# Patient Record
Sex: FEMALE | Race: AMERICAN INDIAN OR ALASKA NATIVE
[De-identification: names, ages, dates, MRNs, and addresses within clinical notes are randomized per-mention and may not be internally consistent; named-entity substitution may affect disease eponyms.]

---

## 2017-04-22 NOTE — ED ELOPEMENT REVIEW
ED Pt Elopement review





- Results review


Lab results: 





 Laboratory Tests











  04/21/17





  14:53


 


Urine HCG, Qual  Negative














- Call Back decision


Pt Call Back Decision: No action required

## 2017-04-22 NOTE — EMERGENCY DEPARTMENT REPORT
ED Extremity Problem HPI





- General


Chief complaint: Extremity Injury, Lower


Stated complaint: PAIN IN KNEE/SWELLING


Time Seen by Provider: 17 13:22


Source: patient, RN notes reviewed, old records reviewed


Mode of arrival: Ambulatory


Limitations: No Limitations





- History of Present Illness


Initial comments: 





PT c/o L knee pain that radiates down her leg x a couple of weeks.  PT states 

she came to the ED yesterday and had an ultra sound but then she left.  PT 

states she has not recently injured her leg/ knee but she has a hx of "sprained 

leg"  PT states she injured her leg at the end of last year.  PT states she 

walks frequently.  





No relief of pain with Goody's powder or Tylenol. 


PT states her leg was swollen yesterday but the swelling decreased after she 

elevated her leg yesterday 


MD Complaint: extremity pain, joint paint


Onset/Timing: 3


-: Gradual, week(s)


Location: left, lower extremity, knee


History of Same: Yes


Severity scale (0 -10): 10


Quality: aching, sharp, constant


Consistency: constant


Improves with: elevation, rest


Worsens with: weight bearing, walking, palpation


Associated Symptoms: denies other symptoms.  denies: fever, rash





- Related Data


 Previous Rx's











 Medication  Instructions  Recorded  Last Taken  Type


 


Ibuprofen [Motrin] 600 mg PO Q8H PRN #15 tablet 17 Unknown Rx


 


traMADol [Ultram] 50 mg PO Q6HR PRN #12 tablet 17 Unknown Rx











 Allergies











Allergy/AdvReac Type Severity Reaction Status Date / Time


 


acetaminophen [From Percocet] Allergy  Itching Verified 17 12:36


 


oxycodone HCl [From Percocet] Allergy  Itching Verified 17 12:36














ED Review of Systems


ROS: 


Stated complaint: PAIN IN KNEE/SWELLING


Other details as noted in HPI





Comment: All other systems reviewed and negative


Constitutional: denies: fever


Cardiovascular: denies: chest pain


Gastrointestinal: denies: nausea, vomiting


Musculoskeletal: as per HPI, joint swelling.  denies: back pain


Skin: denies: rash


Neurological: denies: headache





ED Past Medical Hx





- Past Medical History


Additional medical history: Left leg pain, Hx. of left index finger injury





- Surgical History


Past Surgical History?: No





- Social History


Smoking Status: Current Every Day Smoker


Substance Use Type: Alcohol





- Medications


Home Medications: 


 Home Medications











 Medication  Instructions  Recorded  Confirmed  Last Taken  Type


 


Ibuprofen [Motrin] 600 mg PO Q8H PRN #15 tablet 17  Unknown Rx


 


traMADol [Ultram] 50 mg PO Q6HR PRN #12 tablet 17  Unknown Rx














ED Physical Exam





- General


Limitations: No Limitations


General appearance: alert, in no apparent distress





- Head


Head exam: Present: atraumatic, normocephalic, normal inspection





- Eye


Eye exam: Present: normal appearance, EOMI.  Absent: conjunctival injection





- ENT


ENT exam: Present: normal exam, normal external ear exam





- Neck


Neck exam: Present: normal inspection, full ROM





- Respiratory


Respiratory exam: Present: normal lung sounds bilaterally.  Absent: respiratory 

distress





- Cardiovascular


Cardiovascular Exam: Present: regular rate, normal rhythm





- GI/Abdominal


GI/Abdominal exam: Present: soft.  Absent: tenderness





- Extremities Exam


Extremities exam: Present: normal inspection, full ROM, tenderness, calf 

tenderness.  Absent: pedal edema, joint swelling





- Expanded Lower Extremity Exam


  ** Left


Hip exam: Present: full ROM.  Absent: tenderness


Upper Leg exam: Present: normal inspection.  Absent: tenderness, swelling


Knee exam: Present: normal inspection, full ROM, tenderness (to medial knee ).  

Absent: swelling, ecchymosis, deformity, crepidus


Lower Leg exam: Present: tenderness, Gilmer's sign.  Absent: swelling


Ankle exam: Present: normal inspection, tenderness.  Absent: swelling, 

ecchymosis, deformity, dislocation


Foot/Toe exam: Present: normal inspection, full ROM.  Absent: tenderness


Neuro vascular tendon exam: Absent: pulse deficit





- Back Exam


Back exam: Present: normal inspection, full ROM.  Absent: tenderness, CVA 

tenderness (R), CVA tenderness (L)





- Neurological Exam


Neurological exam: Present: alert, oriented X3





- Psychiatric


Psychiatric exam: Present: normal affect, normal mood





- Skin


Skin exam: Present: warm, dry, intact





ED Course


 Vital Signs











  17





  12:31


 


Temperature 98.0 F


 


Pulse Rate 70


 


Respiratory 18





Rate 


 


Blood Pressure 136/78


 


O2 Sat by Pulse 100





Oximetry 














- Reevaluation(s)


Reevaluation #1: 





17 14:19


PT aware no DVT seen on US yesterday. 


Reevaluation #2: 





17 14:35


PT states pain improved sp Toradol.  PT aware of my interpretation of the XR  


Reevaluation #3: 





17 


PT placed in knee immobilizer by RN, pt NVI 





- Pulse Oximetry Interpretation


  ** Digit-Finger


Initial Pulse Oximetry Readin


Actions Taken: none





ED Medical Decision Making





- Radiology Data


Radiology results: image reviewed


interpreted by me: 


L knee- NAP 





- Differential Diagnosis


dvt, strain, oa, effusion 


Critical Care Time: No


Critical care attestation.: 


If time is entered above; I have spent that time in minutes in the direct care 

of this critically ill patient, excluding procedure time.








ED Disposition


Clinical Impression: 


 Acute pain of left knee





Disposition: DISCHARGED TO HOME OR SELFCARE


Is pt being admited?: No


Does the pt Need Aspirin: No


Condition: Stable


Instructions:  Knee Pain (ED), Knee Immobilizer (ED)


Additional Instructions: 


No driving or ETOH after Ultram 


No Goody's powder with Motrin 


Prescriptions: 


Ibuprofen [Motrin] 600 mg PO Q8H PRN #15 tablet


 PRN Reason: Pain


traMADol [Ultram] 50 mg PO Q6HR PRN #12 tablet


 PRN Reason: Pain


Referrals: 


PRIMARY CARE,MD [Primary Care Provider] - 3-5 Days


ARGELIA LOPEZ MD [Staff Physician] - 3-5 Days


DYLAN BLANCO MD [Staff Physician] - 3-5 Days


Time of Disposition: 14:38

## 2017-04-23 NOTE — XRAY REPORT
LEFT KNEE, 2 views:



History: Left knee pain.



The bony architecture is intact without evidence of fracture or

dislocation.  No significant soft tissue abnormality is seen.



IMPRESSION:

Normal left knee. No significant change since 3/18/15.

## 2017-04-24 NOTE — VASCULAR LAB REPORT
Left Lower Extremity Venous Duplex Study:



Reason for Exam: Pain and swelling of the left lower extremity.



Comments on the Right:

A limited duplex study was done of the proximal veins of the right 

lower extremity. All veins visualized are freely compressible without 

evidence of internal echogenicity.  Flow is spontaneous and phasic 

throughout. No evidence of acute or chronic thrombus is seen in any of 

the vessels visualized.



Comments on the Left:

All veins visualized are freely compressible without evidence of 

internal echogenicity.  Flow is spontaneous and phasic throughout. No 

evidence of acute or chronic thrombus is seen in any of the vessels 

visualized.



Impression:

No evidence of acute or chronic deep venous thrombosis in the left 

lower extremity.

## 2017-11-24 NOTE — EMERGENCY DEPARTMENT REPORT
HPI





- General


Chief Complaint: Sore Throat


Time Seen by Provider: 11/24/17 19:43





- HPI


HPI: 





Patient here reports that she has had sore throat 1 week, painful to swallow.  

She says she had fever and chills.  Denies any nausea or vomiting.  She said it 

is painful to talk.  Pain is worse with swallowing better when she is not 

talking or eating.  She said she has no cough in, nasal congestion or runny 

nose.  Denies any shortness of breath or chest pain.  Denies any swelling of 

tongue or drooling.  Pain is 10 out of 10 and feels sore.  Denies any nausea or 

vomiting.  Denies any abdominal pain.





ED Past Medical Hx





- Past Medical History


Previous Medical History?: Yes


Additional medical history: Left leg pain, Hx. of left index finger injury





- Surgical History


Past Surgical History?: No





- Family History


Family history: hypertension





- Social History


Smoking Status: Current Every Day Smoker


Substance Use Type: Alcohol





- Medications


Home Medications: 


 Home Medications











 Medication  Instructions  Recorded  Confirmed  Last Taken  Type


 


Ibuprofen [Motrin] 600 mg PO Q8H PRN #15 tablet 04/22/17  Unknown Rx


 


traMADol [Ultram] 50 mg PO Q6HR PRN #12 tablet 04/22/17  Unknown Rx


 


Ibuprofen [Motrin] 600 mg PO Q8H PRN 5 Days #15 tablet 11/24/17  Unknown Rx


 


Penicillin V Potassium 500 mg PO Q8H 10 Days #30 tablet 11/24/17  Unknown Rx














ED Review of Systems


ROS: 


Stated complaint: MOUTH PAIN


Other details as noted in HPI





Comment: All other systems reviewed and negative


Constitutional: chills, fever


ENT: throat pain.  denies: ear pain, dental pain, hearing loss, epistaxis, 

congestion


Respiratory: no symptoms reported


Cardiovascular: denies: chest pain, palpitations, dyspnea on exertion, edema, 

syncope


Gastrointestinal: denies: abdominal pain, nausea, vomiting, diarrhea


Musculoskeletal: denies: back pain, arthralgia, myalgia


Skin: denies: rash


Neurological: denies: headache





Physical Exam





- Physical Exam


Vital Signs: 


 Vital Signs











  11/24/17 11/24/17





  17:11 19:48


 


Temperature 99.3 F 98.3 F


 


Pulse Rate 90 91 H


 


Respiratory 20 18





Rate  


 


Blood Pressure 188/96 


 


Blood Pressure  153/94





[Left]  


 


O2 Sat by Pulse 98 99





Oximetry  











General: 





This is 27-year-old female well-nourished well-developed in no acute distress.


Physical Exam: 





Head: Normocephalic atraumatic


Ears:BIateral TM normal and pearly gray .Hayden EAC with normal exam.  No mastoid 

bone tenderness.


Mouth: Moist, positive pharyngeal erythema or exudate .   No tonsillar erythema 

or exudate.  UVULA midline and oral airways patent. No peritonsillar abscess


Neck: Nontender to palpate, supple, normal range of motion.  positive anterior 

adenopathy. No c-spine tenderness.  


 Nose: Bilateral nasal normal without any drainage.  Maxillary and frontal 

sinuses non-tender to palpate. 


 Eyes: Sclerae and  conjunctiva without injection.  Bilateral pupils equal and 

reactive to light.  Bilateral lids are normal.    Normal accommodation.BEOMI


Lungs: Clear to auscultate bilaterally, no rhonchi wheezes or rales.  Normal 

work of breathing and no chest wall tenderness


CV: S1, S2.  Regular rate and rhythm negative murmur.  Capillary refill is less 

than 3 seconds


Skin: Clean dry and intact, no rashes or lesions


Psych: Normal mood and behavior 





ED Course


 Vital Signs











  11/24/17 11/24/17





  17:11 19:48


 


Temperature 99.3 F 98.3 F


 


Pulse Rate 90 91 H


 


Respiratory 20 18





Rate  


 


Blood Pressure 188/96 


 


Blood Pressure  153/94





[Left]  


 


O2 Sat by Pulse 98 99





Oximetry  














- Reevaluation(s)


Reevaluation #1: 





11/24/17 22:34


Patient given Toradol 60 mg IM and Decadron 10 mg IM.  She voiced relief of 

pain.











ED Medical Decision Making





- Medical Decision Making





ED course: Patient presented to emergency room complaining of sore throat and 

painful to swallow with fever and chills over a week.  She denies any exposure 

to strep.  Patient was not having any respiratory symptoms.  Physical findings 

for erythema, exudative pharynx.  Patient also had fever and chills and some 

Cervical adenopathy.  Based on [facility] criteria patient will be treated for 

strep throat.  No need for strep tests.  I discussed patient diagnosis and 

treatment plan and she voiced understanding.  Patient given Decadron 10 mg IV 

for swelling to throat, Toradol 60 mg IM for inflammation and pain.  Patient 

voiced that her pain is better.  Patient discharged home with prescription for 

penicillin VK and Motrin and to follow-up with her primary care physician in 3 

days.  Discussed with her she does not have a primary care physician she will 

need to follow-up at the [facility].


Critical care attestation.: 


If time is entered above; I have spent that time in minutes in the direct care 

of this critically ill patient, excluding procedure time.








ED Disposition


Clinical Impression: 


 Exudative pharyngitis, Fever and chills, Anterior cervical adenopathy





Disposition: DC-01 TO HOME OR SELFCARE


Is pt being admited?: No


Does the pt Need Aspirin: No


Condition: Stable


Instructions:  Strep Throat (ED), Fever in Adults (ED)


Additional Instructions: 


Please increase her fluid intake


Gargle with warm salt water 3-4 times a day to help sore throat


 can take Motrin as scheduled for sore throat and inflammation.


take antibiotic as prescribed


F/U  with primary care physician as instructed and if you do not have a primary 

care physician he can follow up at [facility].


Prescriptions: 


Ibuprofen [Motrin] 600 mg PO Q8H PRN 5 Days #15 tablet


 PRN Reason: Pain


Penicillin V Potassium 500 mg PO Q8H 10 Days #30 tablet


Referrals: 


PRIMARY CARE,MD [Primary Care Provider] - 11/27/17


[facility] [Outside] - 11/27/17


Forms:  Accompanied Note, Work/School Release Form(ED)

## 2019-07-12 NOTE — EVENT NOTE
ED Screening Note


ED Screening Note: 








pt presents with vaginal discharge 


+dysuria 





denies any hx of STDs in the past 








This initial assessment/diagnostic orders/clinical plan/treatment(s) is/are 

subject to change based on patients health status, clinical progression and re-

assessment by fellow clinical providers in the ED. Further treatment and workup 

at subsequent clinical providers discretion. Patient/guardian urged not to elope

from the ED as their condition may be serious if not clinically assessed and 

managed. 





Initial orders include: UA, urine preg

## 2019-07-12 NOTE — EMERGENCY DEPARTMENT REPORT
ED Female  HPI





- General


Chief complaint: Urogenital-Female


Stated complaint: STD CHECK


Time Seen by Provider: 07/12/19 14:51


Source: patient


Mode of arrival: Ambulatory


Limitations: No Limitations





- History of Present Illness


Initial comments: 


 Pt presents with vaginal discharge +dysuria , concern for STD Exposure as 

reported by partner, denies any hx of STDs in the past , vaginal discharge is 

brown and malodorous. 








MD Complaint: vaginal discharge, dysuria


Onset/Timing: 3


-: Gradual, days(s)


Severity scale (0 -10): 4


Quality: cramping


Consistency: constant


Improves with: none


Worsens with: urination


Are you Pregnant Now?: No


Last Menstrual Period: 06/01/19


EDC: 03/07/20


Associated Symptoms: vaginal discharge, dysuria





- Related Data


Sexually active: Yes


                                  Previous Rx's











 Medication  Instructions  Recorded  Last Taken  Type


 


Ibuprofen [Motrin] 600 mg PO Q8H PRN #15 tablet 04/22/17 Unknown Rx


 


traMADol [Ultram] 50 mg PO Q6HR PRN #12 tablet 04/22/17 Unknown Rx


 


Ibuprofen [Motrin] 600 mg PO Q8H PRN 5 Days #15 tablet 11/24/17 Unknown Rx


 


Penicillin V Potassium 500 mg PO Q8H 10 Days #30 tablet 11/24/17 Unknown Rx


 


metroNIDAZOLE [Flagyl] 500 mg PO BID 10 Days #20 tab 07/12/19 Unknown Rx











                                    Allergies











Allergy/AdvReac Type Severity Reaction Status Date / Time


 


acetaminophen [From Percocet] Allergy  Itching Verified 07/12/19 14:25


 


oxycodone HCl [From Percocet] Allergy  Itching Verified 07/12/19 14:25














ED Review of Systems


ROS: 


Stated complaint: STD CHECK


Other details as noted in HPI





Constitutional: denies: chills, fever


Eyes: denies: eye pain, eye discharge, vision change


ENT: denies: ear pain, throat pain


Respiratory: denies: cough, shortness of breath, wheezing


Cardiovascular: denies: chest pain, palpitations


Endocrine: no symptoms reported


Gastrointestinal: denies: abdominal pain, nausea, diarrhea


Genitourinary: urgency, dysuria, frequency, discharge.  denies: hematuria


Musculoskeletal: denies: back pain, joint swelling, arthralgia


Skin: denies: rash, lesions


Neurological: denies: headache, weakness, paresthesias


Psychiatric: denies: anxiety, depression


Hematological/Lymphatic: denies: easy bleeding, easy bruising





ED Past Medical Hx





- Past Medical History


Previous Medical History?: No


Additional medical history: Left leg pain, Hx. of left index finger injury





- Social History


Smoking Status: Current Every Day Smoker


Substance Use Type: Alcohol





- Medications


Home Medications: 


                                Home Medications











 Medication  Instructions  Recorded  Confirmed  Last Taken  Type


 


Ibuprofen [Motrin] 600 mg PO Q8H PRN #15 tablet 04/22/17  Unknown Rx


 


traMADol [Ultram] 50 mg PO Q6HR PRN #12 tablet 04/22/17  Unknown Rx


 


Ibuprofen [Motrin] 600 mg PO Q8H PRN 5 Days #15 tablet 11/24/17  Unknown Rx


 


Penicillin V Potassium 500 mg PO Q8H 10 Days #30 tablet 11/24/17  Unknown Rx


 


metroNIDAZOLE [Flagyl] 500 mg PO BID 10 Days #20 tab 07/12/19  Unknown Rx














ED Physical Exam





- General


Limitations: No Limitations


General appearance: alert, in no apparent distress





- Head


Head exam: Present: atraumatic, normocephalic





- Eye


Eye exam: Present: normal appearance, PERRL, EOMI





- ENT


ENT exam: Present: normal exam, mucous membranes moist





- Neck


Neck exam: Present: normal inspection, full ROM





- Respiratory


Respiratory exam: Present: normal lung sounds bilaterally.  Absent: respiratory 

distress, wheezes, stridor, chest wall tenderness





- Cardiovascular


Cardiovascular Exam: Present: regular rate, normal rhythm, normal heart sounds. 

 Absent: systolic murmur, diastolic murmur, rubs, gallop





- GI/Abdominal


GI/Abdominal exam: Present: soft, normal bowel sounds.  Absent: distended, 

tenderness, guarding, rebound, rigid, bruit, hernia





- Rectal


Rectal exam: Present: deferred





- 


External exam: Present: normal external exam


Speculum exam: Present: erythema, vaginal discharge (brownish grey malodorous 

moderate erythema no cmt ).  Absent: cervical discharge, vaginal bleeding, 

foreign body, laceration





- Extremities Exam


Extremities exam: Present: normal inspection





- Back Exam


Back exam: Present: normal inspection, full ROM.  Absent: tenderness, CVA 

tenderness (R), CVA tenderness (L), muscle spasm, paraspinal tenderness, rash 

noted





- Neurological Exam


Neurological exam: Present: alert, oriented X3, CN II-XII intact, normal gait, 

reflexes normal.  Absent: motor sensory deficit





- Psychiatric


Psychiatric exam: Present: normal affect, normal mood





- Skin


Skin exam: Present: warm, dry, intact, normal color.  Absent: rash





ED Course


                                   Vital Signs











  07/12/19





  14:52


 


Temperature 98.0 F


 


Pulse Rate 99 H


 


Respiratory 16





Rate 


 


Blood Pressure 143/78


 


O2 Sat by Pulse 99





Oximetry 














ED Medical Decision Making





- Medical Decision Making


 pt request std exposure tx , vaganal exam completed cultures pending will call 

pt if abnormal findings , pt tx'd phrophylactically as requested for std 

exposure. pt dc't to home in stable condition at this time. 





Critical care attestation.: 


If time is entered above; I have spent that time in minutes in the direct care 

of this critically ill patient, excluding procedure time.








ED Disposition


Clinical Impression: 


 Exposure to STD, Bacterial vaginosis





Disposition: DC-01 TO HOME OR SELFCARE


Is pt being admited?: No


Does the pt Need Aspirin: No


Condition: Stable


Instructions:  Bacterial Vaginosis (ED)


Additional Instructions: 


follow up with health department for HIV and HSV Screening


Prescriptions: 


metroNIDAZOLE [Flagyl] 500 mg PO BID 10 Days #20 tab


Referrals: 


[facility], MD [Primary Care Provider] - 3-5 Days


Forms:  STI Treatment and Prevention


Time of Disposition: 22:20

## 2019-09-27 NOTE — EMERGENCY DEPARTMENT REPORT
ED Female  HPI





- General


Chief complaint: Urogenital-Female


Stated complaint: BURNS WHEN URINATION/PAIN


Time Seen by Provider: 09/27/19 10:13


Source: patient


Mode of arrival: Ambulatory


Limitations: No Limitations





- History of Present Illness


Initial comments: 





Pt. c/o vaginal irritation and dysuria. No fever or flank pain. symptoms are 

consistant with prior trich infxn.


MD Complaint: vaginal discharge


Onset/Timing: 3


-: Gradual, days(s)


Location: suprapubic


Radiation: non-radiating


Severity scale (0 -10): 5


Quality: cramping


Consistency: intermittent


Improves with: none


Worsens with: intercourse


Are you Pregnant Now?: No





- Related Data


                                  Previous Rx's











 Medication  Instructions  Recorded  Last Taken  Type


 


Ibuprofen [Motrin] 600 mg PO Q8H PRN #15 tablet 04/22/17 Unknown Rx


 


traMADol [Ultram] 50 mg PO Q6HR PRN #12 tablet 04/22/17 Unknown Rx


 


Ibuprofen [Motrin] 600 mg PO Q8H PRN 5 Days #15 tablet 11/24/17 Unknown Rx


 


Penicillin V Potassium 500 mg PO Q8H 10 Days #30 tablet 11/24/17 Unknown Rx


 


Fluconazole [Diflucan TAB] 150 mg PO ONCE #1 tablet 09/27/19 Unknown Rx


 


metroNIDAZOLE [Flagyl TAB] 4 tab PO ONCE 1 Days #4 tab 09/27/19 Unknown Rx











                                    Allergies











Allergy/AdvReac Type Severity Reaction Status Date / Time


 


acetaminophen [From Percocet] Allergy  Itching Verified 07/12/19 14:25


 


oxycodone HCl [From Percocet] Allergy  Itching Verified 07/12/19 14:25














ED Review of Systems


ROS: 


Stated complaint: BURNS WHEN URINATION/PAIN


Other details as noted in HPI





Comment: All other systems reviewed and negative


Respiratory: denies: cough, orthopnea


Cardiovascular: denies: chest pain, dyspnea on exertion


Gastrointestinal: denies: nausea


Genitourinary: urgency, dysuria





ED Past Medical Hx





- Past Medical History


Previous Medical History?: No


Additional medical history: Left leg pain, Hx. of left index finger injury





- Surgical History


Past Surgical History?: No





- Social History


Smoking Status: Current Every Day Smoker


Substance Use Type: Alcohol





- Medications


Home Medications: 


                                Home Medications











 Medication  Instructions  Recorded  Confirmed  Last Taken  Type


 


Ibuprofen [Motrin] 600 mg PO Q8H PRN #15 tablet 04/22/17  Unknown Rx


 


traMADol [Ultram] 50 mg PO Q6HR PRN #12 tablet 04/22/17  Unknown Rx


 


Ibuprofen [Motrin] 600 mg PO Q8H PRN 5 Days #15 tablet 11/24/17  Unknown Rx


 


Penicillin V Potassium 500 mg PO Q8H 10 Days #30 tablet 11/24/17  Unknown Rx


 


Fluconazole [Diflucan TAB] 150 mg PO ONCE #1 tablet 09/27/19  Unknown Rx


 


metroNIDAZOLE [Flagyl TAB] 4 tab PO ONCE 1 Days #4 tab 09/27/19  Unknown Rx














ED Physical Exam





- General


Limitations: No Limitations


General appearance: alert, in no apparent distress





- Head


Head exam: Present: atraumatic, normocephalic





- Eye


Eye exam: Present: normal appearance, PERRL, EOMI


Pupils: Present: normal accommodation





- ENT


ENT exam: Present: normal exam, normal orophraynx





- Neck


Neck exam: Present: normal inspection





- Respiratory


Respiratory exam: Present: normal lung sounds bilaterally





- Cardiovascular


Cardiovascular Exam: Present: regular rate, normal rhythm





- GI/Abdominal


GI/Abdominal exam: Present: soft





- 


External exam: Present: normal external exam


Speculum exam: Present: cervical discharge


Bi-manual exam: Present: cervical motion tendernes





- Extremities Exam


Extremities exam: Present: normal inspection





- Back Exam


Back exam: Present: normal inspection





ED Course


                                   Vital Signs











  09/27/19





  09:26


 


Temperature 98 F


 


Pulse Rate 88


 


Respiratory 16





Rate 


 


Blood Pressure 138/48





[Left] 


 


O2 Sat by Pulse 95





Oximetry 











Critical care attestation.: 


If time is entered above; I have spent that time in minutes in the direct care 

of this critically ill patient, excluding procedure time.








ED Disposition


Clinical Impression: 


 Trichomonal cervicitis





Disposition: DC-01 TO HOME OR SELFCARE


Is pt being admited?: No


Does the pt Need Aspirin: No


Condition: Stable


Prescriptions: 


Fluconazole [Diflucan TAB] 150 mg PO ONCE #1 tablet


metroNIDAZOLE [Flagyl TAB] 4 tab PO ONCE 1 Days #4 tab


Referrals: 


PRIMARY CARE,MD [Primary Care Provider] - 3-5 Days


Forms:  STI Treatment and Prevention, Work/School Release Form(ED)

## 2020-12-17 ENCOUNTER — HOSPITAL ENCOUNTER (EMERGENCY)
Dept: HOSPITAL 5 - ED | Age: 30
Discharge: HOME | End: 2020-12-17
Payer: SELF-PAY

## 2020-12-17 VITALS — DIASTOLIC BLOOD PRESSURE: 90 MMHG | SYSTOLIC BLOOD PRESSURE: 137 MMHG

## 2020-12-17 DIAGNOSIS — Z79.899: ICD-10-CM

## 2020-12-17 DIAGNOSIS — I10: ICD-10-CM

## 2020-12-17 DIAGNOSIS — F17.200: ICD-10-CM

## 2020-12-17 DIAGNOSIS — R42: Primary | ICD-10-CM

## 2020-12-17 PROCEDURE — 99282 EMERGENCY DEPT VISIT SF MDM: CPT

## 2020-12-17 NOTE — EMERGENCY DEPARTMENT REPORT
Minor Respiratory





- HPI


Chief Complaint: Sore Throat


Stated Complaint: SORE THROAT/CHILLS


Time Seen by Provider: 12/17/20 09:50


Duration: 3 Days


Pain Location: Throat


Severity: moderate


Minor Respiratory: Yes Sore Throat, Yes Able to Tolerate Fluids, No Rhinorrhea, 

No Ear Pain, No Cough, No Sick Contacts, No Hemoptysis, No Chest Pain, No 

Shortness of Breath, No Fever


Other History: Patient is a 30-year-old -American female that comes to 

the ER with a sore throat for several days.  ABCs are intact.  She is 

controlling secretions.  She denies fever or chills.  Denies cough.  Denies 

nausea vomiting or abdominal pain.  She has not seen a primary care doctor for 

this complaint.  She is ambulatory and nontoxic to [facility]





ED Review of Systems


ROS: 


Stated complaint: SORE THROAT/CHILLS


Other details as noted in HPI





Comment: All other systems reviewed and negative





ED Past Medical Hx





- Past Medical History


Hx Hypertension: Yes


Additional medical history: Left leg pain, Hx. of left index finger injury





- Surgical History


Past Surgical History?: No





- Family History


Family history: no significant





- Social History


Smoking Status: Current Every Day Smoker


Substance Use Type: Alcohol, Marijuana





- Medications


Home Medications: 


                                Home Medications











 Medication  Instructions  Recorded  Confirmed  Last Taken  Type


 


Amoxicillin [Trimox CAP] 500 mg PO BID #20 capsule 12/17/20  Unknown Rx














Minor Respiratory Exam





- Exam


General: 


Vital signs noted. No distress. Alert and acting appropriately.





HEENT: Yes Pharyngeal Erythema, Yes Pharyngeal Exudates, Yes Moist Mucous 

Membranes, No Rhinorrhea, No Conjuctival Injection, No Frontal Tenderness, No 

Maxillary Tenderness


Ear: Neither TM Bulge, Neither TM Erythema, Neither EAC Pain, Neither EAC 

Discharge


Neck: Yes Supple, No Adenopathy


Lungs: Yes Good Air Exchange, No Wheezes, No Ronchi, No Stridor, No Cough, No 

Labored Respirations, No Retractions, No Use of Accessory Muscles, No Other 

Abnormal Lung Sounds


Heart: Yes Regular, No Murmur


Abdomen: Yes Normal Bowel Sounds, No Tenderness, No Peritoneal Signs


Skin: No Rash, No Edema


Neurologic: 


Alert and oriented, no deficits.








Musculoskeletal: 


Unremarkable.











ED Course


                                   Vital Signs











  12/17/20





  08:37


 


Temperature 99.3 F


 


Pulse Rate 108 H


 


Respiratory 18





Rate 


 


Blood Pressure 137/90


 


O2 Sat by Pulse 98





Oximetry 














ED Medical Decision Making





- Medical Decision Making





Patient has bilateral exudative pharyngitis.





Controlling secretions.  Taking p.o.





Patient nonfebrile.  No hypotension or tachycardia.  Heart rate on provider exam

is 100.  Note patient has low-grade fever 99.3 on admission for which she is 

been given Tylenol.  She is also morbidly obese





Patient given p.o. challenge and has kept fluids down.  She has been given 

amoxicillin and Motrin in the ER.





Patient being discharged home with detailed discharge instructions including 

medications, hydration, diet and follow-up.  She verbalizes understanding of 

discharge plan of care.





                                   Vital Signs











  12/17/20





  08:37


 


Temperature 99.3 F


 


Pulse Rate 108 H


 


Respiratory 18





Rate 


 


Blood Pressure 137/90


 


O2 Sat by Pulse 98





Oximetry 














- Differential Diagnosis


uri


Critical care attestation.: 


If time is entered above; I have spent that time in minutes in the direct care 

of this critically ill patient, excluding procedure time.








ED Disposition


Clinical Impression: 


 Exudative pharyngitis





Disposition: DC-01 TO HOME OR SELFCARE


Is pt being admited?: No


Does the pt Need Aspirin: No


Condition: Stable


Instructions:  Upper Respiratory Infection, Adult, Strep Throat, Adult, 

Easy-to-Read


Additional Instructions: 


Medications as ordered today





Stay well-hydrated





Medication as ordered today





Motrin or Tylenol for pain or discomfort





Follow-up with PCP in 48 hours to make sure you are getting better.  Referral 

below








Prescriptions: 


Amoxicillin [Trimox CAP] 500 mg PO BID #20 capsule


Referrals: 


PRIMARY MD KENYATTA [Primary Care Provider] - 3-5 Days


UMU SMALLWOOD MD [Staff Physician] - 3-5 Days


Time of Disposition: 10:02

## 2020-12-30 ENCOUNTER — HOSPITAL ENCOUNTER (EMERGENCY)
Dept: HOSPITAL 5 - ED | Age: 30
Discharge: LEFT BEFORE DISCHARGE | End: 2020-12-30
Payer: SELF-PAY

## 2020-12-30 VITALS — DIASTOLIC BLOOD PRESSURE: 70 MMHG | SYSTOLIC BLOOD PRESSURE: 136 MMHG

## 2020-12-30 DIAGNOSIS — Z53.21: ICD-10-CM

## 2020-12-30 DIAGNOSIS — N89.8: Primary | ICD-10-CM

## 2020-12-30 NOTE — EMERGENCY DEPARTMENT REPORT
Chief Complaint: Urogenital-Female


Stated Complaint: VAGINAL ITCHING AND BURNING


Time Seen by Provider: 12/30/20 14:35





- HPI


History of Present Illness: 





Patient is a 30-year-old female who presents emergency room with complaints of 

vaginal itching that began a few days ago.  She states that she completed a 

course of amoxicillin for strep throat and then began to have the itching 

shortly after.  She states that she has a thick white cottage cheeselike 

discharge.  She states that there is some burning around the areas that she has 

been scratching.  She denies any dysuria, fever, abdominal pain, pelvic pain.  

No past medical history.  Allergies to oxycodone.  Last menstrual cycle 

12/14/2020.  She has not used anything for her symptoms.





Vitals are stable











On exam:


Non toxic appearing, no acute distress


atraumatic, normocephalic


normal appearance of the eyes, EOMI, no periorbital edema or ecchymosis


moist mucus membranes


No respiratory distress, no accessory muscle use


A&O x4, no focal neuro deficit














Patient is presenting with what appears most consistent with vaginal candidiasis


Likely secondary to amoxicillin use


advised pt Please use Monistat 7-day over-the-counter.  Please eat yogurt and 

take probiotics.  Follow-up with a primary care doctor if symptoms or not 

improving.  Return to emergency room for any new or worsening symptoms.


Discussed in detail with patient strict return precautions











Medical screening examination performed and there is no threat to life or limb 

at this time





- Exam


Vital Signs: 


                                   Vital Signs











  12/30/20





  10:48


 


Temperature 98.1 F


 


Pulse Rate 71


 


Respiratory 18





Rate 


 


Blood Pressure 136/70





[Right] 


 


O2 Sat by Pulse 100





Oximetry 











MSE screening note: 


Focused history and physical exam performed.


Due to findings the following was ordered:











ED Disposition for MSE


Clinical Impression: 


 Vaginal itching





Disposition: Z-07 MED SCREENING EXAM-LEFT


Is pt being admited?: No


Does the pt Need Aspirin: No


Condition: Stable


Instructions:  Vaginal Yeast Infection, Adult


Additional Instructions: 


Please use Monistat 7-day over-the-counter.  Please eat yogurt and take 

probiotics.  Follow-up with a primary care doctor if symptoms or not improving. 

 Return to emergency room for any new or worsening symptoms.


Referrals: 


PRIMARY CARE,MD [Primary Care Provider] - 2-3 Days


UM[facility] SMALLWOOD MD [Staff Physician] - 2-3 Days


[facility] [Provider Group] - 2-3 Days


[facility] [Outside] - 2-3 Days


Time of Disposition: 14:38


Print Language: ENGLISH

## 2022-04-21 ENCOUNTER — HOSPITAL ENCOUNTER (EMERGENCY)
Dept: HOSPITAL 5 - ED | Age: 32
Discharge: HOME | End: 2022-04-21
Payer: SELF-PAY

## 2022-04-21 VITALS — DIASTOLIC BLOOD PRESSURE: 89 MMHG | SYSTOLIC BLOOD PRESSURE: 136 MMHG
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
## 2022-04-21 DIAGNOSIS — F12.90: ICD-10-CM

## 2022-04-21 DIAGNOSIS — Z88.5: ICD-10-CM
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
## 2022-04-21 DIAGNOSIS — M25.461: ICD-10-CM
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
## 2022-04-21 DIAGNOSIS — G89.29: ICD-10-CM
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
## 2022-04-21 DIAGNOSIS — Z79.899: ICD-10-CM

## 2022-04-21 DIAGNOSIS — I10: ICD-10-CM

## 2022-04-21 DIAGNOSIS — S83.8X1A: Primary | ICD-10-CM

## 2022-04-21 PROCEDURE — 99283 EMERGENCY DEPT VISIT LOW MDM: CPT

## 2022-04-21 NOTE — XRAY REPORT
RIGHT KNEE 3 VIEWS



INDICATION / CLINICAL INFORMATION:

Intermittent knee pain after twisting injury several months ago.



COMPARISON:

None available.

 

FINDINGS:



BONES / JOINT(S): No acute fracture or subluxation. No significant arthritis.

SOFT TISSUES: Joint effusion. No significant soft tissue swelling.



ADDITIONAL FINDINGS: None.





IMPRESSION:

Small joint effusion otherwise no significant abnormality.



Signer Name: Kaiden Walker MD 

Signed: 4/21/2022 1:57 PM

Workstation Name: [id number]

## 2022-04-21 NOTE — EMERGENCY DEPARTMENT REPORT
ED Extremity Problem HPI





- General


Chief complaint: Extremity Injury, Lower


Stated complaint: RT LEG FLUID/PAIN


Source: patient


Mode of arrival: Ambulatory


Limitations: No Limitations





- History of Present Illness


Initial comments: 





Patient is a 31-year-old -American female with a history of chronic right

knee pain from an old injury presents to the ED with complaint of acute onset 

persistent painful swollen right knee after she bumped it against a metallic 

furniture about 4 days ago.  Patient states that the pain is constant and 

persistent and that she is unable to bear weight on the right knee due to pain 

and swelling.  Patient denies fall, nausea, vomiting, chest pain, shortness of 

breath, hip pain, back pain, numbness and tingling or weakness of lower 

extremities bilaterally.


MD Complaint: extremity pain (Chronic right knee pain), extremity swelling 

(Right knee joint effusion and swelling), joint swelling (Right knee swelling), 

joint paint (Right knee joint pain)


-: Gradual, year(s) (1)


Location: right, knee (Right knee pain)


History of Same: Yes (Chronic)


-: Yes arthralgia (Right knee pain), No fever, No associated dyspnea, No 

associated chest pain


Radiation: none


Severity scale (0 -10): 8


Quality: aching, sharp


Consistency: constant


Improves with: nothing


Worsens with: weight bearing, walking, exertion, palpation


Associated Symptoms: denies other symptoms, arthralgias (Painful right knee 

joint).  denies: chest pain, shortness of breath, fever, myalgias, rash





- Related Data


                                  Previous Rx's











 Medication  Instructions  Recorded  Last Taken  Type


 


Amoxicillin [Trimox CAP] 500 mg PO BID #20 capsule 20 Unknown Rx


 


Naproxen 500 mg PO Q12H PRN #30 tab 22 Unknown Rx


 


predniSONE [Deltasone] 60 mg PO QDAY #15 tab 22 Unknown Rx


 


traMADoL [Ultram] 50 mg PO Q6HR PRN #12 tablet 22 Unknown Rx











                                    Allergies











Allergy/AdvReac Type Severity Reaction Status Date / Time


 


oxycodone HCl [From Percocet] Allergy  Itching Verified 20 10:44














ED Review of Systems


ROS: 


Stated complaint: RT LEG FLUID/PAIN


Other details as noted in HPI





Constitutional: denies: chills, fever


Eyes: denies: eye pain, eye discharge, vision change


ENT: denies: ear pain, throat pain


Respiratory: denies: cough, shortness of breath, wheezing


Cardiovascular: denies: chest pain, palpitations


Endocrine: no symptoms reported


Gastrointestinal: denies: abdominal pain, nausea, diarrhea


Genitourinary: denies: urgency, dysuria, discharge


Musculoskeletal: joint swelling (Mild right knee swelling and pain), arthralgia 

(Right knee pain)


Skin: denies: rash, lesions


Neurological: denies: headache, weakness, paresthesias


Psychiatric: denies: anxiety, depression


Hematological/Lymphatic: denies: easy bleeding, easy bruising





ED Past Medical Hx





- Past Medical History


Previous Medical History?: Yes


Hx Hypertension: Yes


Hx Heart Attack/AMI: Yes


Hx Arthritis: Yes (Chronic right knee pain)


Additional medical history: Left leg pain, Hx. of left index finger injury





- Surgical History


Past Surgical History?: No





- Social History


Smoking Status: Never Smoker


Substance Use Type: Marijuana





- Medications


Home Medications: 


                                Home Medications











 Medication  Instructions  Recorded  Confirmed  Last Taken  Type


 


Amoxicillin [Trimox CAP] 500 mg PO BID #20 capsule 20  Unknown Rx


 


Naproxen 500 mg PO Q12H PRN #30 tab 22  Unknown Rx


 


predniSONE [Deltasone] 60 mg PO QDAY #15 tab 22  Unknown Rx


 


traMADoL [Ultram] 50 mg PO Q6HR PRN #12 tablet 22  Unknown Rx














ED Physical Exam





- General


Limitations: No Limitations


General appearance: alert, in no apparent distress





- Head


Head exam: Present: atraumatic, normocephalic, normal inspection





- Eye


Eye exam: Present: normal appearance, PERRL, EOMI


Pupils: Present: normal accommodation





- ENT


ENT exam: Present: normal exam, normal orophraynx, mucous membranes moist, TM's 

normal bilaterally, normal external ear exam





- Neck


Neck exam: Present: normal inspection, full ROM.  Absent: tenderness





- Respiratory


Respiratory exam: Present: normal lung sounds bilaterally.  Absent: respiratory 

distress, wheezes, rales, rhonchi, chest wall tenderness, accessory muscle use, 

decreased breath sounds, prolonged expiratory





- Cardiovascular


Cardiovascular Exam: Present: regular rate, normal rhythm, normal heart sounds. 

 Absent: systolic murmur, diastolic murmur, rubs, gallop





- GI/Abdominal


GI/Abdominal exam: Present: soft, normal bowel sounds.  Absent: tenderness, 

guarding, hyperactive bowel sounds, hypoactive bowel sounds, mass, bruit





- Extremities Exam


Extremities exam: Present: normal inspection, full ROM, tenderness (Palpable 

right knee tenderness with mild swelling), normal capillary refill, joint 

swelling (Mild right knee joint swelling).  Absent: pedal edema, calf tenderness





- Back Exam


Back exam: Present: normal inspection, full ROM.  Absent: tenderness, CVA 

tenderness (R), CVA tenderness (L), muscle spasm, paraspinal tenderness, 

vertebral tenderness





- Neurological Exam


Neurological exam: Present: alert, oriented X3, CN II-XII intact, normal gait, 

reflexes normal





- Psychiatric


Psychiatric exam: Present: normal affect, normal mood





- Skin


Skin exam: Present: warm, dry, intact, normal color.  Absent: rash





ED Course





                                   Vital Signs











  22





  10:48 10:55


 


Temperature 98.3 F 98.3 F


 


Pulse Rate 85 85


 


Respiratory 18 18





Rate  


 


Blood Pressure 149/87 


 


Blood Pressure  149/87





[Left]  


 


O2 Sat by Pulse 98 98





Oximetry  














ED Medical Decision Making





- Radiology Data


Radiology results: report reviewed, image reviewed





[facility]  


                                     [address] [place] [address]  


 


                                            XRay Report   


                                               Signed  


 


Patient: JUANA BRITTON                                               

                 MR#  


: [id number]          


: 1990                                                                

Acct:[id number]      


 


Age/Sex: 31 / F                                                                

ADM Date: 22     


 


Loc: ED       


Attending Dr:   


 


 


Ordering Physician: FLAVIA PEDRAZA  


Date of Service: 22  


Procedure(s): XR knee 3V RT  


Accession Number(s): [id number]  


 


cc: FLAVIA PEDRAZA   


 


Fluoro Time In Minutes:   


 


RIGHT KNEE 3 VIEWS  


 


 INDICATION / CLINICAL INFORMATION:  


 Intermittent knee pain after twisting injury several months ago.  


 


 COMPARISON:  


 None available.  


 


 FINDINGS:  


 


 BONES / JOINT(S): No acute fracture or subluxation. No significant arthritis.  


 SOFT TISSUES: Joint effusion. No significant soft tissue swelling.  


 


 ADDITIONAL FINDINGS: None.  


 


 


 IMPRESSION:  


 Small joint effusion otherwise no significant abnormality.  


 


 Signer Name: Kaiden Walker MD   


 Signed: 2022 1:57 PM  


 Workstation Name: [id number]   


 


 


Transcribed By: SW  


Dictated By: Kaiden Walker MD  


Electronically Authenticated By: Kaiden Walker MD    


Signed Date/Time: 22                                


 


 


 


DD/DT: 22                                                            

  


TD/TT:








- Medical Decision Making





This is a 31-year-old -American female with a history of chronic right 

knee pain from an old injury presents to the ED with complaint of acute onset 

persistent painful swollen right knee after she bumped it against a metallic 

furniture about 4 days ago.  Patient states that the pain is constant and 

persistent and that she is unable to bear weight on the right knee due to pain 

and swelling.  In the ED, patient is alert and oriented x3 and is not in any 

distress.  Patient was treated for pain in the ED.  Right knee x-ray showed no 

acute fractures or subluxations and small joint effusion otherwise no 

significant abnormality.  Patient right knee was splinted with Ace wrap and 

patient will discharge home on pain medications and advised to follow-up with 

her primary care physician in 7 to 10 days for reevaluation or return to the ED 

immediately if symptoms get worse.





- Differential Diagnosis


Knee fracture; knee sprain; knee contusion; osteoarthritis; knee effusion


Critical care attestation.: 


If time is entered above; I have spent that time in minutes in the direct care 

of this critically ill patient, excluding procedure time.








ED Disposition


Clinical Impression: 


 Effusion of knee joint right





Sprain of right knee


Qualifiers:


 Encounter type: initial encounter Involved ligament of knee: unspecified 

ligament Qualified Code(s): S83.91XA - Sprain of unspecified site of right knee,

 initial encounter





Disposition:  HOME / SELF CARE / HOMELESS


Is pt being admited?: No


Does the pt Need Aspirin: No


Condition: Stable


Instructions:  Knee Effusion, Easy-to-Read, Knee Sprain, Adult, Easy-to-Read


Additional Instructions: 


The right knee x-ray showed no acute fractures or subluxations but a small joint

 effusion otherwise no significant abnormality. Therefore take medication with 

food, drink plenty of fluids and follow-up with your primary care physician in 7

 to 10 days for reevaluation.  Return to the ED immediately if symptoms get 

worse.


Prescriptions: 


predniSONE [Deltasone] 60 mg PO QDAY #15 tab


Naproxen 500 mg PO Q12H PRN #30 tab


 PRN Reason: Pain , Severe (7-10)


traMADoL [Ultram] 50 mg PO Q6HR PRN #12 tablet


 PRN Reason: Pain


Referrals: 


UUM SMALLWOOD MD [Primary Care Provider] - 3-5 Days


Forms:  Work/School Release Form(ED)


Time of Disposition: 14:21


Print Language: ENGLISH

## 2022-06-02 ENCOUNTER — HOSPITAL ENCOUNTER (EMERGENCY)
Dept: HOSPITAL 5 - ED | Age: 32
Discharge: LEFT BEFORE DISCHARGE | End: 2022-06-02
Payer: SELF-PAY

## 2022-06-02 VITALS — DIASTOLIC BLOOD PRESSURE: 92 MMHG | SYSTOLIC BLOOD PRESSURE: 172 MMHG

## 2022-06-02 DIAGNOSIS — Z53.21: ICD-10-CM

## 2022-06-02 DIAGNOSIS — M79.605: Primary | ICD-10-CM
